# Patient Record
Sex: FEMALE | Race: ASIAN | NOT HISPANIC OR LATINO | URBAN - METROPOLITAN AREA
[De-identification: names, ages, dates, MRNs, and addresses within clinical notes are randomized per-mention and may not be internally consistent; named-entity substitution may affect disease eponyms.]

---

## 2022-09-02 ENCOUNTER — INPATIENT (INPATIENT)
Facility: HOSPITAL | Age: 57
LOS: 0 days | Discharge: HOME | End: 2022-09-03
Attending: INTERNAL MEDICINE | Admitting: INTERNAL MEDICINE

## 2022-09-02 VITALS
HEART RATE: 78 BPM | TEMPERATURE: 98 F | SYSTOLIC BLOOD PRESSURE: 121 MMHG | DIASTOLIC BLOOD PRESSURE: 70 MMHG | RESPIRATION RATE: 16 BRPM | OXYGEN SATURATION: 99 %

## 2022-09-02 LAB
ALBUMIN SERPL ELPH-MCNC: 5.1 G/DL — HIGH (ref 3.3–5)
ALP SERPL-CCNC: 76 U/L — SIGNIFICANT CHANGE UP (ref 40–120)
ALT FLD-CCNC: 15 U/L — SIGNIFICANT CHANGE UP (ref 4–33)
ANION GAP SERPL CALC-SCNC: 12 MMOL/L — SIGNIFICANT CHANGE UP (ref 7–14)
AST SERPL-CCNC: 15 U/L — SIGNIFICANT CHANGE UP (ref 4–32)
BASOPHILS # BLD AUTO: 0.02 K/UL — SIGNIFICANT CHANGE UP (ref 0–0.2)
BASOPHILS NFR BLD AUTO: 0.3 % — SIGNIFICANT CHANGE UP (ref 0–2)
BILIRUB SERPL-MCNC: 0.3 MG/DL — SIGNIFICANT CHANGE UP (ref 0.2–1.2)
BUN SERPL-MCNC: 15 MG/DL — SIGNIFICANT CHANGE UP (ref 7–23)
CALCIUM SERPL-MCNC: 9.4 MG/DL — SIGNIFICANT CHANGE UP (ref 8.4–10.5)
CHLORIDE SERPL-SCNC: 102 MMOL/L — SIGNIFICANT CHANGE UP (ref 98–107)
CO2 SERPL-SCNC: 25 MMOL/L — SIGNIFICANT CHANGE UP (ref 22–31)
CREAT SERPL-MCNC: 0.71 MG/DL — SIGNIFICANT CHANGE UP (ref 0.5–1.3)
EGFR: 100 ML/MIN/1.73M2 — SIGNIFICANT CHANGE UP
EOSINOPHIL # BLD AUTO: 0.13 K/UL — SIGNIFICANT CHANGE UP (ref 0–0.5)
EOSINOPHIL NFR BLD AUTO: 1.8 % — SIGNIFICANT CHANGE UP (ref 0–6)
GLUCOSE SERPL-MCNC: 96 MG/DL — SIGNIFICANT CHANGE UP (ref 70–99)
HCT VFR BLD CALC: 39.8 % — SIGNIFICANT CHANGE UP (ref 34.5–45)
HGB BLD-MCNC: 13.4 G/DL — SIGNIFICANT CHANGE UP (ref 11.5–15.5)
IANC: 5.17 K/UL — SIGNIFICANT CHANGE UP (ref 1.8–7.4)
IMM GRANULOCYTES NFR BLD AUTO: 0.3 % — SIGNIFICANT CHANGE UP (ref 0–1.5)
LIDOCAIN IGE QN: 66 U/L — HIGH (ref 7–60)
LYMPHOCYTES # BLD AUTO: 1.54 K/UL — SIGNIFICANT CHANGE UP (ref 1–3.3)
LYMPHOCYTES # BLD AUTO: 21.5 % — SIGNIFICANT CHANGE UP (ref 13–44)
MAGNESIUM SERPL-MCNC: 2.3 MG/DL — SIGNIFICANT CHANGE UP (ref 1.6–2.6)
MCHC RBC-ENTMCNC: 30.8 PG — SIGNIFICANT CHANGE UP (ref 27–34)
MCHC RBC-ENTMCNC: 33.7 GM/DL — SIGNIFICANT CHANGE UP (ref 32–36)
MCV RBC AUTO: 91.5 FL — SIGNIFICANT CHANGE UP (ref 80–100)
MONOCYTES # BLD AUTO: 0.29 K/UL — SIGNIFICANT CHANGE UP (ref 0–0.9)
MONOCYTES NFR BLD AUTO: 4 % — SIGNIFICANT CHANGE UP (ref 2–14)
NEUTROPHILS # BLD AUTO: 5.17 K/UL — SIGNIFICANT CHANGE UP (ref 1.8–7.4)
NEUTROPHILS NFR BLD AUTO: 72.1 % — SIGNIFICANT CHANGE UP (ref 43–77)
NRBC # BLD: 0 /100 WBCS — SIGNIFICANT CHANGE UP (ref 0–0)
NRBC # FLD: 0 K/UL — SIGNIFICANT CHANGE UP (ref 0–0)
PLATELET # BLD AUTO: 223 K/UL — SIGNIFICANT CHANGE UP (ref 150–400)
POTASSIUM SERPL-MCNC: 3.9 MMOL/L — SIGNIFICANT CHANGE UP (ref 3.5–5.3)
POTASSIUM SERPL-SCNC: 3.9 MMOL/L — SIGNIFICANT CHANGE UP (ref 3.5–5.3)
PROT SERPL-MCNC: 7.6 G/DL — SIGNIFICANT CHANGE UP (ref 6–8.3)
RBC # BLD: 4.35 M/UL — SIGNIFICANT CHANGE UP (ref 3.8–5.2)
RBC # FLD: 12.7 % — SIGNIFICANT CHANGE UP (ref 10.3–14.5)
SODIUM SERPL-SCNC: 139 MMOL/L — SIGNIFICANT CHANGE UP (ref 135–145)
TROPONIN T, HIGH SENSITIVITY RESULT: <6 NG/L — SIGNIFICANT CHANGE UP
WBC # BLD: 7.17 K/UL — SIGNIFICANT CHANGE UP (ref 3.8–10.5)
WBC # FLD AUTO: 7.17 K/UL — SIGNIFICANT CHANGE UP (ref 3.8–10.5)

## 2022-09-02 PROCEDURE — 71045 X-RAY EXAM CHEST 1 VIEW: CPT | Mod: 26

## 2022-09-02 PROCEDURE — 93010 ELECTROCARDIOGRAM REPORT: CPT

## 2022-09-02 PROCEDURE — 99285 EMERGENCY DEPT VISIT HI MDM: CPT | Mod: 25

## 2022-09-02 NOTE — ED PROVIDER NOTE - PATIENT PORTAL LINK FT
You can access the FollowMyHealth Patient Portal offered by [facility] by registering at the following website: [email or web address]. By joining [employer]’s FollowMyHealth portal, you will also be able to view your health information using other applications (apps) compatible with our system.

## 2022-09-02 NOTE — ED PROVIDER NOTE - PROGRESS NOTE DETAILS
pt stable, pending repeat trop /ekg, ct angio to be done. sign out to Dr Shirley Dasia Thibodeaux MD ([id number]) -  Patient requests to be discharged. States she will get the echo and EST performed outpatient. States her chest pain has resolved. I informed this patient of the need for further medical evaluation and care given the patient's current medical condition. This patient declined further medical evaluation and treatment and wishes to be discharged. I informed this patient of the benefits of further medical evaluation and care at this facility.  I informed this patient of the risks of leaving against our medical advice without properly completing our evaluation today that include illness, injury, permanent disability and even death.  The patient was also informed about alternatives. At the time of discussion this patient maintained full faculties of judgement and medical decision making capacity. I have answered all of the patient's questions. I reiterated my medical opinion and advised the patient to return at any time. In accordance with this patient's wishes the patient was discharged from the emergency department. pt stable, pending repeat trop /ekg, ct angio to be done. recommend admit to tele for cp.   sign out to Dr Shirley

## 2022-09-02 NOTE — ED PROVIDER NOTE - NSFOLLOWUPINSTRUCTIONS_ED_ALL_ED_FT
Follow up with your PMD and/or cardiologist within 48-72 hours. Show copies of your reports given to you. Take all of your other medications as previously prescribed.     ****For Cardiology: Call [phone] to schedule an appointment ASAP. Most patients can be seen within 48 hours. Mention that you were just discharged from the emergency room and need to be seen immediately.    You should return to the hospital if you begin to have worsening or persistent chest pain especially that radiate to the arm/jaw/back, shortness of breath or chest pain with exertion that is different than normal for you, new or worsening in any lower extremity edema (swelling), sudden onset of cold sweats with difficulty breathing, or for any other concerns.    Form more information on Heart Health please visit the American Heart Association's Website at: [email or web address]

## 2022-09-02 NOTE — ED ADULT NURSE NOTE - OBJECTIVE STATEMENT
Pt received to room 3. Pt a&oX4, amb at baseline. Denies pmh. Pt c/o L sided epigastric region chest pain beginning at 2pm and occurring for 1x Pt received to room 3. Pt a&oX4, amb at baseline. Denies pmh. Pt c/o L sided epigastric region chest pain beginning at 2pm and occurring for 1x hour. Pt states at the time the chest pain began it felt like "knife stabbing," but now it feels "like a constant rock against my chest." Pt denies having similar symptoms in the past. Chest pain is pleuritic and pt appears slightly dyspneic while talking. NSR on the monitor satting 100% room air. Pt also endorses having COVID on June 2nd, but denies having SOB s/p COVID. Pt endorsing symptoms of dizziness as well. Pt states she saw a PCP but was sent in for further cardiac workup. Pt vitally stable, resp even unlabored, abd soft nontender, pedal pulses 2+ bilaterally, no lower extremity swelling noted. Denies n/v/d, headache, numbness/tingling to hands/feet, fevers, chills ,recent travel/sick contact. 20G to L AC placed, labs sent, pt awaiting CTA

## 2022-09-02 NOTE — ED PROVIDER NOTE - NSFOLLOWUPCLINICS_GEN_ALL_ED_FT
[facility]  Cardiology  [address] [place] [id number]  Phone: [phone]  Fax:

## 2022-09-02 NOTE — ED PROVIDER NOTE - PHYSICAL EXAMINATION
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 able to speak in full and clear sentences. no wheeze, rales or stridor. pulse ox 100  soft nontender abdomen. no  rebound. no guarding. no sign of trauma. no CVAT   no pedal edema. no calf tenderness. normal pulses bilateral feet.

## 2022-09-02 NOTE — ED PROVIDER NOTE - OBJECTIVE STATEMENT
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 Located in the epigastric region, "feels like indigestion" nonexertional, pleuritic chest pain. pain radiate to the back on the left upper  back. Associated with SOB and [place]. no fever no cough no travel no leg swelling or calf pain no n/v/d no palpitations. nonsmoker.

## 2022-09-03 VITALS
SYSTOLIC BLOOD PRESSURE: 121 MMHG | HEART RATE: 66 BPM | DIASTOLIC BLOOD PRESSURE: 81 MMHG | TEMPERATURE: 97 F | RESPIRATION RATE: 20 BRPM | OXYGEN SATURATION: 98 %

## 2022-09-03 DIAGNOSIS — R07.9 CHEST PAIN, UNSPECIFIED: ICD-10-CM

## 2022-09-03 LAB
SARS-COV-2 RNA SPEC QL NAA+PROBE: SIGNIFICANT CHANGE UP
TROPONIN T, HIGH SENSITIVITY RESULT: <6 NG/L — SIGNIFICANT CHANGE UP

## 2022-09-03 PROCEDURE — 71275 CT ANGIOGRAPHY CHEST: CPT | Mod: 26,MA

## 2022-09-03 RX ORDER — FAMOTIDINE 10 MG/ML
20 INJECTION INTRAVENOUS ONCE
Refills: 0 | Status: COMPLETED | OUTPATIENT
Start: 2022-09-03 | End: 2022-09-03

## 2022-09-03 RX ADMIN — FAMOTIDINE 20 MILLIGRAM(S): 10 INJECTION INTRAVENOUS at 03:34

## 2022-09-03 RX ADMIN — Medication 30 MILLILITER(S): at 03:35

## 2022-09-03 NOTE — ED ADULT NURSE REASSESSMENT NOTE - NS ED NURSE REASSESS COMMENT FT1
Resting comfortably on stretcher. Alert and oriented x 4,  Not in acute distress. Respirations are even and nonlabored. safety maintained. Meds given as per order.

## 2022-09-03 NOTE — CHART NOTE - NSCHARTNOTEFT_GEN_A_CORE
I went to see the patient for the admission.    When I went to bedside, she is Maori speaking but spoke some english. I used the  however the patient refused  and understood what I was saying.    I also explained to her that she is being admitted to the hospital as per the ED resident. She did not know this and asks about lab work. I explained to her that her lab work is normal and there are no issues. Troponins neg x2. She also reported chest pain improving and she is feeling better. She wants to go home.    ED resident came to bedside to explain the reasoning for the admission, after she is finished, she asked me about how long she needs to stay. I said for a few days because she may need an echocardiogram and stress test to be done.    The patient states she wants to go home and wants to speak with the ED resident.    I went to talk to the ED resident about this. Admission on hold for now until final decision has been made for admission. I requested them to call [facility] phone for an update. I went to see the patient for the admission.    When I went to bedside, she is Syriac speaking but spoke some english. I used the  however the patient refused  and understood what I was saying.    I also explained to her that she is being admitted to the hospital as per the ED resident. She did not know this and asks about lab work. I explained to her that her lab work is normal and there are no issues. Troponins neg x2, EKG wnl no ST changes or TWI. She also reported chest pain improving and she is feeling better. She wants to go home.    ED resident came to bedside to explain the reasoning for the admission, after she is finished, she asked me about how long she needs to stay. I said for a few days because she may need an echocardiogram and stress test to be done.    The patient states she wants to go home and wants to speak with the ED resident.    I went to talk to the ED resident about this. Admission on hold for now until final decision has been made for admission. I requested them to call [facility] phone for an update. I went to see the patient for the admission.    When I went to bedside, she is Mongolian speaking but spoke some english. I used the  however the patient refused  and understood what I was saying.    I also explained to her that she is being admitted to the hospital as per the ED resident. She did not know this and asks about lab work. I explained to her that her lab work is normal and there are no issues. Troponins neg x2, EKG wnl no ST changes or TWI. She also reported chest pain improving and she is feeling better. She wants to go home.    ED resident came to bedside to explain the reasoning for the admission, after she is finished, she asked me about how long she needs to stay. I said for a few days because she may need an echocardiogram and stress test to be done.    The patient states she wants to go home and wants to speak with the ED resident.    I went to talk to the ED resident about this. Admission on hold for now until final decision has been made for admission. I requested them to call [facility] phone for an update.      ***Just spoke with ED resident Morelia at 05:30AM, she is being discharged now***

## 2022-09-07 DIAGNOSIS — Z78.9 OTHER SPECIFIED HEALTH STATUS: ICD-10-CM

## 2022-09-07 DIAGNOSIS — Z82.3 FAMILY HISTORY OF STROKE: ICD-10-CM

## 2022-09-07 DIAGNOSIS — R06.02 SHORTNESS OF BREATH: ICD-10-CM

## 2022-09-07 DIAGNOSIS — R07.9 CHEST PAIN, UNSPECIFIED: ICD-10-CM

## 2022-09-07 PROBLEM — Z00.00 ENCOUNTER FOR PREVENTIVE HEALTH EXAMINATION: Status: ACTIVE | Noted: 2022-09-07

## 2022-09-08 ENCOUNTER — NON-APPOINTMENT (OUTPATIENT)
Age: 57
End: 2022-09-08

## 2022-09-08 ENCOUNTER — APPOINTMENT (OUTPATIENT)
Dept: CARDIOLOGY | Facility: CLINIC | Age: 57
End: 2022-09-08

## 2022-09-08 VITALS
TEMPERATURE: 97.4 F | HEIGHT: 66 IN | DIASTOLIC BLOOD PRESSURE: 74 MMHG | BODY MASS INDEX: 20.41 KG/M2 | SYSTOLIC BLOOD PRESSURE: 107 MMHG | OXYGEN SATURATION: 99 % | WEIGHT: 127 LBS | HEART RATE: 72 BPM

## 2022-09-08 DIAGNOSIS — E78.00 PURE HYPERCHOLESTEROLEMIA, UNSPECIFIED: ICD-10-CM

## 2022-09-08 DIAGNOSIS — R07.89 OTHER CHEST PAIN: ICD-10-CM

## 2022-09-08 PROCEDURE — 99203 OFFICE O/P NEW LOW 30 MIN: CPT | Mod: 25

## 2022-09-08 PROCEDURE — 93000 ELECTROCARDIOGRAM COMPLETE: CPT

## 2022-09-11 PROBLEM — R07.89 CHEST PRESSURE: Status: ACTIVE | Noted: 2022-09-07

## 2022-09-11 PROBLEM — E78.00 ELEVATED CHOLESTEROL: Status: ACTIVE | Noted: 2022-09-07

## 2022-09-11 NOTE — REVIEW OF SYSTEMS
[SOB] : no shortness of breath [Chest Discomfort] : chest discomfort [Lower Ext Edema] : no extremity edema [Leg Claudication] : no intermittent leg claudication [Palpitations] : no palpitations [Orthopnea] : no orthopnea [PND] : no PND [Syncope] : no syncope [Negative] : Musculoskeletal

## 2022-09-11 NOTE — DISCUSSION/SUMMARY
[Non-Cardiac] : non-cardiac chest pain [Stable] : stable [FreeTextEntry1] : \par Currently stable from a cardiovascular standpoint. Normotensive (low normal). Euvolemic. Atypical chest pains likely musculoskeletal in origin. ECG completed today and reviewed (findings as noted above). Records from ED visit reviewed. Will schedule an exercise ECG stress test for cardiac risk stratification. In addition, will schedule an echocardiogram to assess her cardiac structures and function. Pending the test results, I will make further recommendations. [EKG obtained to assist in diagnosis and management of assessed problem(s)] : EKG obtained to assist in diagnosis and management of assessed problem(s)

## 2022-09-11 NOTE — HISTORY OF PRESENT ILLNESS
[FreeTextEntry1] : Has been experiencing chest pressure which is constant and unrelated to exertion. Denies shortness of breath or palpitations. Was evaluated in [facility] ED. Initial testing negative. Was offered admission for further workup but she had declined. She reports that she has noticed that moving her arms up and down seems to aggravate chest symptoms.

## 2022-09-16 ENCOUNTER — OUTPATIENT (OUTPATIENT)
Dept: OUTPATIENT SERVICES | Facility: HOSPITAL | Age: 57
LOS: 1 days | End: 2022-09-16

## 2022-09-16 ENCOUNTER — APPOINTMENT (OUTPATIENT)
Dept: CV DIAGNOSITCS | Facility: HOSPITAL | Age: 57
End: 2022-09-16

## 2022-09-16 ENCOUNTER — APPOINTMENT (OUTPATIENT)
Dept: CV DIAGNOSTICS | Facility: HOSPITAL | Age: 57
End: 2022-09-16

## 2022-09-16 DIAGNOSIS — R07.9 CHEST PAIN, UNSPECIFIED: ICD-10-CM

## 2022-09-16 PROCEDURE — 93306 TTE W/DOPPLER COMPLETE: CPT | Mod: 26

## 2022-09-16 PROCEDURE — 93016 CV STRESS TEST SUPVJ ONLY: CPT | Mod: GC

## 2022-09-16 PROCEDURE — 93018 CV STRESS TEST I&R ONLY: CPT | Mod: GC
